# Patient Record
Sex: MALE | Race: WHITE | NOT HISPANIC OR LATINO
[De-identification: names, ages, dates, MRNs, and addresses within clinical notes are randomized per-mention and may not be internally consistent; named-entity substitution may affect disease eponyms.]

---

## 2020-06-09 PROBLEM — Z00.00 ENCOUNTER FOR PREVENTIVE HEALTH EXAMINATION: Status: ACTIVE | Noted: 2020-06-09

## 2020-06-15 ENCOUNTER — APPOINTMENT (OUTPATIENT)
Dept: SURGERY | Facility: CLINIC | Age: 35
End: 2020-06-15
Payer: COMMERCIAL

## 2020-06-15 VITALS
TEMPERATURE: 98.1 F | DIASTOLIC BLOOD PRESSURE: 84 MMHG | BODY MASS INDEX: 25.77 KG/M2 | HEART RATE: 75 BPM | SYSTOLIC BLOOD PRESSURE: 132 MMHG | HEIGHT: 69 IN | WEIGHT: 174 LBS

## 2020-06-15 DIAGNOSIS — Z78.9 OTHER SPECIFIED HEALTH STATUS: ICD-10-CM

## 2020-06-15 PROCEDURE — 46600 DIAGNOSTIC ANOSCOPY SPX: CPT

## 2020-06-15 PROCEDURE — 99203 OFFICE O/P NEW LOW 30 MIN: CPT | Mod: 25

## 2020-06-16 PROBLEM — Z78.9 NON-SMOKER: Status: ACTIVE | Noted: 2020-06-12

## 2020-06-16 RX ORDER — PIROXICAM 10 MG/1
10 CAPSULE ORAL
Qty: 60 | Refills: 0 | Status: ACTIVE | COMMUNITY
Start: 2020-01-14

## 2020-06-16 RX ORDER — OSELTAMIVIR PHOSPHATE 75 MG/1
75 CAPSULE ORAL
Qty: 10 | Refills: 0 | Status: ACTIVE | COMMUNITY
Start: 2019-12-30

## 2020-06-16 RX ORDER — CLINDAMYCIN PHOSPHATE 1 G/10ML
1 GEL TOPICAL
Qty: 30 | Refills: 0 | Status: ACTIVE | COMMUNITY
Start: 2020-06-01

## 2020-06-16 RX ORDER — UREA 20 %
20 CREAM (GRAM) TOPICAL
Qty: 85 | Refills: 0 | Status: ACTIVE | COMMUNITY
Start: 2019-06-13

## 2020-06-16 RX ORDER — BENZONATATE 200 MG/1
200 CAPSULE ORAL
Qty: 90 | Refills: 0 | Status: ACTIVE | COMMUNITY
Start: 2020-03-07

## 2020-06-16 RX ORDER — DICLOFENAC SODIUM 20 MG/G
2 SOLUTION TOPICAL
Qty: 112 | Refills: 0 | Status: ACTIVE | COMMUNITY
Start: 2020-02-25

## 2020-06-16 NOTE — PROCEDURE
[FreeTextEntry1] : ROSIE and anoscopy show normal sphincter tone, normal internal hemorrhoids and no masses.\par

## 2020-06-16 NOTE — PHYSICAL EXAM
[Abdomen Masses] : No abdominal masses [Abdomen Tenderness] : ~T No ~M abdominal tenderness [No HSM] : no hepatosplenomegaly [Fistula] : no fistulas [Excoriation] : no perianal excoriation [Ulcer ___ cm] : no ulcers [Pilonidal Cyst] : no pilonidal cysts [Wart] : no warts [Nonprolapsing] : a nonprolapsing (grade I) [Tender, Swollen] : nontender, non-swollen [Normal] : was normal [Thrombosed] : that was not thrombosed [Skin Tags] : residual hemorrhoidal skin tags were noted [None] : there was no rectal abscess [Normal Rate and Rhythm] : normal rate and rhythm [Respiratory Effort] : normal respiratory effort [de-identified] : External examination shows a 2 cm thickened and nodular skin tag in the posterior midline.  No erythema induration or purulence [de-identified] : awake, alert and in no acute distress

## 2020-06-16 NOTE — ASSESSMENT
[FreeTextEntry1] : 34M with perianal skin tag\par \par I discussed the skin tag with the patient.  The skin tag appears thickened and nodular on exam.  It is unclear if this is part of the skin tag itself or secondary to previous treatment.  Given the abnormal appearance I recommended examination under anesthesia and excision of perianal skin tag.  All risks benefits and alternatives including bleeding, infection, damage to the sphincter complex, incontinence to gas or stool or recurrence were discussed.  I described a post operative course of pain for approximately 2-4 weeks. Patient was in agreement with the plan and signed surgical consent.\par

## 2020-06-16 NOTE — HISTORY OF PRESENT ILLNESS
[FreeTextEntry1] : Patient is a 34M who presents for evaluation of an anal skin tag.  Patient states he noticed extra tissue in the area for approximately 6 months.  He was evaluated by Dr. Salazar of dermatology who diagnosed the patient with perianal skin tags.  He treated them in the office with some improvement.  The patient states that there is still some residual skin tag present and would like to be evaluated for excision.  He denies other symptoms inclduing fevers, chills, nausea, vomiting, abdominal pain, constipation, diarrhea, blood in his stool or unexpected weight loss.  Patient denies a family history of colon cancer rectal cancer or inflammatory bowel disease.  Patient has never had a colonoscopy.\par

## 2020-06-16 NOTE — CONSULT LETTER
[Dear  ___] : Dear  [unfilled], [Consult Closing:] : Thank you very much for allowing me to participate in the care of this patient.  If you have any questions, please do not hesitate to contact me. [Consult Letter:] : I had the pleasure of evaluating your patient, [unfilled]. [Please see my note below.] : Please see my note below. [FreeTextEntry3] : Sincerely,\par \par Venkat Melvin MD, Colon and Rectal Surgery\par \par [employer] at [facility]\par [address] [place] [address]\par [facility], 3rd Floor\par [place], [place] [address]\par Tel [phone] ext [phone]\par Fax [phone]\par

## 2020-07-06 ENCOUNTER — OUTPATIENT (OUTPATIENT)
Dept: OUTPATIENT SERVICES | Facility: HOSPITAL | Age: 35
LOS: 1 days | Discharge: HOME | End: 2020-07-06

## 2020-07-06 VITALS
WEIGHT: 179.02 LBS | HEIGHT: 69 IN | RESPIRATION RATE: 18 BRPM | OXYGEN SATURATION: 100 % | DIASTOLIC BLOOD PRESSURE: 78 MMHG | SYSTOLIC BLOOD PRESSURE: 128 MMHG | HEART RATE: 78 BPM | TEMPERATURE: 97 F

## 2020-07-06 DIAGNOSIS — K64.4 RESIDUAL HEMORRHOIDAL SKIN TAGS: ICD-10-CM

## 2020-07-06 DIAGNOSIS — Z01.818 ENCOUNTER FOR OTHER PREPROCEDURAL EXAMINATION: ICD-10-CM

## 2020-07-06 NOTE — H&P PST ADULT - NSANTHOSAYNRD_GEN_A_CORE
No. AYLIN screening performed.  STOP BANG Legend: 0-2 = LOW Risk; 3-4 = INTERMEDIATE Risk; 5-8 = HIGH Risk

## 2020-07-07 ENCOUNTER — OUTPATIENT (OUTPATIENT)
Dept: OUTPATIENT SERVICES | Facility: HOSPITAL | Age: 35
LOS: 1 days | Discharge: HOME | End: 2020-07-07

## 2020-07-07 ENCOUNTER — LABORATORY RESULT (OUTPATIENT)
Age: 35
End: 2020-07-07

## 2020-07-07 DIAGNOSIS — Z11.59 ENCOUNTER FOR SCREENING FOR OTHER VIRAL DISEASES: ICD-10-CM

## 2020-07-10 ENCOUNTER — APPOINTMENT (OUTPATIENT)
Dept: SURGERY | Facility: HOSPITAL | Age: 35
End: 2020-07-10

## 2020-07-10 ENCOUNTER — OUTPATIENT (OUTPATIENT)
Dept: OUTPATIENT SERVICES | Facility: HOSPITAL | Age: 35
LOS: 1 days | Discharge: HOME | End: 2020-07-10
Payer: COMMERCIAL

## 2020-07-10 ENCOUNTER — RESULT REVIEW (OUTPATIENT)
Age: 35
End: 2020-07-10

## 2020-07-10 VITALS — RESPIRATION RATE: 18 BRPM | DIASTOLIC BLOOD PRESSURE: 76 MMHG | SYSTOLIC BLOOD PRESSURE: 138 MMHG | HEART RATE: 85 BPM

## 2020-07-10 VITALS
DIASTOLIC BLOOD PRESSURE: 82 MMHG | RESPIRATION RATE: 17 BRPM | HEIGHT: 69 IN | OXYGEN SATURATION: 100 % | WEIGHT: 173.94 LBS | TEMPERATURE: 98 F | HEART RATE: 79 BPM | SYSTOLIC BLOOD PRESSURE: 139 MMHG

## 2020-07-10 PROCEDURE — 88304 TISSUE EXAM BY PATHOLOGIST: CPT | Mod: 26

## 2020-07-10 PROCEDURE — 46220 EXCISE ANAL EXT TAG/PAPILLA: CPT

## 2020-07-10 RX ORDER — OXYCODONE AND ACETAMINOPHEN 5; 325 MG/1; MG/1
1 TABLET ORAL ONCE
Refills: 0 | Status: DISCONTINUED | OUTPATIENT
Start: 2020-07-10 | End: 2020-07-10

## 2020-07-10 RX ORDER — SODIUM CHLORIDE 9 MG/ML
1000 INJECTION, SOLUTION INTRAVENOUS
Refills: 0 | Status: DISCONTINUED | OUTPATIENT
Start: 2020-07-10 | End: 2020-07-13

## 2020-07-10 RX ORDER — HYDROMORPHONE HYDROCHLORIDE 2 MG/ML
0.5 INJECTION INTRAMUSCULAR; INTRAVENOUS; SUBCUTANEOUS
Refills: 0 | Status: DISCONTINUED | OUTPATIENT
Start: 2020-07-10 | End: 2020-07-13

## 2020-07-10 RX ORDER — DOCUSATE SODIUM 100 MG
1 CAPSULE ORAL
Qty: 15 | Refills: 0
Start: 2020-07-10 | End: 2020-07-14

## 2020-07-10 RX ORDER — OXYCODONE AND ACETAMINOPHEN 5; 325 MG/1; MG/1
1 TABLET ORAL
Qty: 20 | Refills: 0
Start: 2020-07-10 | End: 2020-07-14

## 2020-07-10 RX ORDER — ONDANSETRON 8 MG/1
4 TABLET, FILM COATED ORAL ONCE
Refills: 0 | Status: DISCONTINUED | OUTPATIENT
Start: 2020-07-10 | End: 2020-07-13

## 2020-07-10 RX ADMIN — OXYCODONE AND ACETAMINOPHEN 1 TABLET(S): 5; 325 TABLET ORAL at 18:03

## 2020-07-10 RX ADMIN — SODIUM CHLORIDE 75 MILLILITER(S): 9 INJECTION, SOLUTION INTRAVENOUS at 18:04

## 2020-07-10 NOTE — ASU DISCHARGE PLAN (ADULT/PEDIATRIC) - ASU DC SPECIAL INSTRUCTIONSFT
You are being discharged from [facility]. Please follow-up in Dr. Melvin's office as previously scheduled. You have been prescribed Percocet for breakthrough pain relief, please take as directed. Please avoid driving while taking narcotic pain medication. You have also been prescribed Colace for constipation, Please take as needed. If you have any further questions about your care, please do not hesitate to contact Dr. Melvin's office.

## 2020-07-10 NOTE — PRE-ANESTHESIA EVALUATION ADULT - NSANTHOSAYNRD_GEN_A_CORE
No. AYLIN screening performed.  STOP BANG Legend: 0-2 = LOW Risk; 3-4 = INTERMEDIATE Risk; 5-8 = HIGH Risk
No. AYLIN screening performed.  STOP BANG Legend: 0-2 = LOW Risk; 3-4 = INTERMEDIATE Risk; 5-8 = HIGH Risk

## 2020-07-10 NOTE — BRIEF OPERATIVE NOTE - NSICDXBRIEFPROCEDURE_GEN_ALL_CORE_FT
PROCEDURES:  Anal tag excision 10-Jul-2020 17:47:10  Judy Osei  Exam under anesthesia, anus 10-Jul-2020 17:46:35  Judy Osei

## 2020-07-10 NOTE — ASU DISCHARGE PLAN (ADULT/PEDIATRIC) - CARE PROVIDER_API CALL
Venkat Melvin)  ColonRectal Surgery; Surgery  [address] [facility], [address]rd [place] [address]  Phone: [phone] ext [phone]  Fax: [phone]  Established Patient  Follow Up Time:

## 2020-07-10 NOTE — ASU PREOP CHECKLIST - TO WHOM
opportunity for questions and answers rendered RIGOBERTO Abebe RN opportunity for questions and answers rendered

## 2020-07-14 LAB
SURGICAL PATHOLOGY STUDY: SIGNIFICANT CHANGE UP
SURGICAL PATHOLOGY STUDY: SIGNIFICANT CHANGE UP

## 2020-07-16 DIAGNOSIS — K64.4 RESIDUAL HEMORRHOIDAL SKIN TAGS: ICD-10-CM

## 2020-07-16 RX ORDER — DOCUSATE SODIUM 100 MG/1
100 CAPSULE ORAL 3 TIMES DAILY
Qty: 90 | Refills: 2 | Status: ACTIVE | COMMUNITY
Start: 2020-07-16 | End: 1900-01-01

## 2020-07-27 ENCOUNTER — APPOINTMENT (OUTPATIENT)
Dept: SURGERY | Facility: CLINIC | Age: 35
End: 2020-07-27
Payer: COMMERCIAL

## 2020-07-27 VITALS
WEIGHT: 179 LBS | HEIGHT: 69 IN | SYSTOLIC BLOOD PRESSURE: 124 MMHG | DIASTOLIC BLOOD PRESSURE: 78 MMHG | HEART RATE: 108 BPM | BODY MASS INDEX: 26.51 KG/M2 | TEMPERATURE: 97.9 F

## 2020-07-27 PROCEDURE — 99024 POSTOP FOLLOW-UP VISIT: CPT

## 2020-07-27 NOTE — CONSULT LETTER
[Dear  ___] : Dear  [unfilled], [Consult Letter:] : I had the pleasure of evaluating your patient, [unfilled]. [Please see my note below.] : Please see my note below. [Consult Closing:] : Thank you very much for allowing me to participate in the care of this patient.  If you have any questions, please do not hesitate to contact me. [FreeTextEntry3] : Sincerely,\par \par Venkat Melvin MD, Colon and Rectal Surgery\par \par [employer] at [facility]\par [address] [place] [address]\par [facility], 3rd Floor\par [place], [place] [address]\par Tel [phone] ext 2\par Fax [phone]\par

## 2020-07-27 NOTE — HISTORY OF PRESENT ILLNESS
[FreeTextEntry1] : Patient is a 34M who presents for follow up of an anal skin tag s/p excision.  Patient states he noticed extra tissue in the area for approximately 6 months.  He was evaluated by Dr. Salazar of dermatology who diagnosed the patient with perianal skin tags.  He treated them in the office with some improvement.  On 7/10/2020 he underwent EUA and excision of skin tag.  Pathology showed fibroepithelial polyp.  He presents for follow up.  He states he feels well.  He has minimal pain and bleeding.  He does notice an area that he feels may be another skin tag. He denies other symptoms including fevers, chills, nausea, vomiting, abdominal pain, constipation, diarrhea, blood in his stool or unexpected weight loss.  Patient denies a family history of colon cancer rectal cancer or inflammatory bowel disease.  Patient has never had a colonoscopy.\par

## 2020-07-27 NOTE — PHYSICAL EXAM
[Abdomen Tenderness] : ~T No ~M abdominal tenderness [Abdomen Masses] : No abdominal masses [No HSM] : no hepatosplenomegaly [Excoriation] : no perianal excoriation [Fistula] : no fistulas [Wart] : no warts [Ulcer ___ cm] : no ulcers [Pilonidal Cyst] : no pilonidal cysts [Nonprolapsing] : a nonprolapsing (grade I) [Tender, Swollen] : nontender, non-swollen [Thrombosed] : that was not thrombosed [Skin Tags] : residual hemorrhoidal skin tags were noted [Normal] : was normal [None] : there was no rectal mass  [Respiratory Effort] : normal respiratory effort [Normal Rate and Rhythm] : normal rate and rhythm [de-identified] : External examination shows a partially healed wound with a 0.5cm skin edematous skin tag in the posterior midline.  No erythema induration or purulence [de-identified] : awake, alert and in no acute distress

## 2020-07-27 NOTE — ASSESSMENT
[FreeTextEntry1] : 34M s/p excision of anal skin tag\par \par The patient is recovering well.  I recommended that he continue to keep the area clean and dry.  We will see him back in 1 month.

## 2020-08-24 ENCOUNTER — APPOINTMENT (OUTPATIENT)
Dept: SURGERY | Facility: CLINIC | Age: 35
End: 2020-08-24
Payer: COMMERCIAL

## 2020-08-24 VITALS
TEMPERATURE: 97.9 F | HEART RATE: 96 BPM | HEIGHT: 69 IN | DIASTOLIC BLOOD PRESSURE: 78 MMHG | WEIGHT: 180 LBS | SYSTOLIC BLOOD PRESSURE: 132 MMHG | BODY MASS INDEX: 26.66 KG/M2

## 2020-08-24 PROBLEM — Z78.9 OTHER SPECIFIED HEALTH STATUS: Chronic | Status: ACTIVE | Noted: 2020-07-06

## 2020-08-24 PROCEDURE — 99213 OFFICE O/P EST LOW 20 MIN: CPT

## 2020-08-24 NOTE — CONSULT LETTER
[Consult Letter:] : I had the pleasure of evaluating your patient, [unfilled]. [Dear  ___] : Dear  [unfilled], [Please see my note below.] : Please see my note below. [FreeTextEntry3] : Sincerely,\par \par Venkat Melvin MD, Colon and Rectal Surgery\par \par [employer] at [facility]\par [address] [place] [address]\par [facility], 3rd Floor\par [place], [place] [address]\par Tel [phone] ext 2\par Fax [phone]\par  [Consult Closing:] : Thank you very much for allowing me to participate in the care of this patient.  If you have any questions, please do not hesitate to contact me.

## 2020-08-24 NOTE — ASSESSMENT
[FreeTextEntry1] : 34M s/p excision of anal skin tag\par \par The patient is recovering well.  I recommended that he continue to keep the area clean and dry.  He would like to have the skin tag excised.  We discussed waiting until his wound is completely healed prior.  We will see him back in 1 month.

## 2020-08-24 NOTE — HISTORY OF PRESENT ILLNESS
[FreeTextEntry1] : Patient is a 34M who presents for follow up of an anal skin tag s/p excision.  Patient states he noticed extra tissue in the area for approximately 6 months.  He was evaluated by Dr. Salazar of dermatology who diagnosed the patient with perianal skin tags.  He treated them in the office with some improvement.  On 7/10/2020 he underwent EUA and excision of skin tag.  Pathology showed fibroepithelial polyp.  On his last visit he was found to have a healing wound and a small adjacent skin tag.  He presents for follow up.  He states he feels well.  He has no pain or bleeding.  He denies other symptoms including fevers, chills, nausea, vomiting, abdominal pain, constipation, diarrhea, blood in his stool or unexpected weight loss.  Patient denies a family history of colon cancer rectal cancer or inflammatory bowel disease.  Patient has never had a colonoscopy.\par

## 2020-08-24 NOTE — PHYSICAL EXAM
[Abdomen Tenderness] : ~T No ~M abdominal tenderness [Abdomen Masses] : No abdominal masses [No HSM] : no hepatosplenomegaly [Excoriation] : no perianal excoriation [Fistula] : no fistulas [Wart] : no warts [Ulcer ___ cm] : no ulcers [Pilonidal Cyst] : no pilonidal cysts [Nonprolapsing] : a nonprolapsing (grade I) [Tender, Swollen] : nontender, non-swollen [Thrombosed] : that was not thrombosed [Normal] : was normal [Skin Tags] : residual hemorrhoidal skin tags were noted [None] : there was no rectal abscess [Respiratory Effort] : normal respiratory effort [de-identified] : External examination shows a nearly healed wound with a 0.5cm skin tag in the posterior midline.  No erythema induration or purulence [Normal Rate and Rhythm] : normal rate and rhythm [de-identified] : awake, alert and in no acute distress

## 2020-09-21 ENCOUNTER — APPOINTMENT (OUTPATIENT)
Dept: SURGERY | Facility: CLINIC | Age: 35
End: 2020-09-21
Payer: COMMERCIAL

## 2020-09-21 VITALS
HEIGHT: 69 IN | SYSTOLIC BLOOD PRESSURE: 120 MMHG | DIASTOLIC BLOOD PRESSURE: 72 MMHG | WEIGHT: 184 LBS | HEART RATE: 83 BPM | BODY MASS INDEX: 27.25 KG/M2 | TEMPERATURE: 97.6 F

## 2020-09-21 PROCEDURE — 99212 OFFICE O/P EST SF 10 MIN: CPT

## 2020-09-21 NOTE — PHYSICAL EXAM
[Abdomen Masses] : No abdominal masses [Abdomen Tenderness] : ~T No ~M abdominal tenderness [No HSM] : no hepatosplenomegaly [Excoriation] : no perianal excoriation [Fistula] : no fistulas [Wart] : no warts [Ulcer ___ cm] : no ulcers [Pilonidal Cyst] : no pilonidal cysts [Nonprolapsing] : a nonprolapsing (grade I) [Tender, Swollen] : nontender, non-swollen [Thrombosed] : that was not thrombosed [Skin Tags] : residual hemorrhoidal skin tags were noted [Normal] : was normal [None] : there was no rectal mass  [Respiratory Effort] : normal respiratory effort [Normal Rate and Rhythm] : normal rate and rhythm [de-identified] : External examination shows a healed wound with a 0.5cm skin tag in the posterior midline.  No erythema induration or purulence [de-identified] : awake, alert and in no acute distress

## 2020-09-21 NOTE — ASSESSMENT
[FreeTextEntry1] : 34M s/p excision of anal skin tag\par \par The patient has recovered well. We discussed the small skin tag that is present.  He expressed interest in having it removed.  We discussed removing it in the office on his next visit.  We will see him back in 1 month.

## 2020-09-21 NOTE — CONSULT LETTER
[Dear  ___] : Dear  [unfilled], [Courtesy Letter:] : I had the pleasure of seeing your patient, [unfilled], in my office today. [Please see my note below.] : Please see my note below. [Consult Closing:] : Thank you very much for allowing me to participate in the care of this patient.  If you have any questions, please do not hesitate to contact me. [FreeTextEntry3] : Sincerely,\par \par Venkat Melvin MD, Colon and Rectal Surgery\par \par [employer] at [facility]\par [address] [place] [address]\par [facility], 3rd Floor\par [place], [place] [address]\par Tel [phone] ext 2\par Fax [phone]\par

## 2020-09-21 NOTE — HISTORY OF PRESENT ILLNESS
[FreeTextEntry1] : Patient is a 34M who presents for follow up of an anal skin tag s/p excision.  Patient states he noticed extra tissue in the area for approximately 6 months.  He was evaluated by Dr. Salazar of dermatology who diagnosed the patient with perianal skin tags.  He treated them in the office with some improvement.  On 7/10/2020 he underwent EUA and excision of skin tag.  Pathology showed fibroepithelial polyp.  On his last visit he was found to have a healing wound and a small adjacent skin tag.  Since his last visit he has had no changes. He has daily BM without issue. He denies other symptoms including fevers, chills, nausea, vomiting, abdominal pain, constipation, diarrhea, blood in his stool or unexpected weight loss.  Patient denies a family history of colon cancer rectal cancer or inflammatory bowel disease.  Patient has never had a colonoscopy.\par

## 2020-10-19 ENCOUNTER — APPOINTMENT (OUTPATIENT)
Dept: SURGERY | Facility: CLINIC | Age: 35
End: 2020-10-19
Payer: COMMERCIAL

## 2020-10-19 ENCOUNTER — LABORATORY RESULT (OUTPATIENT)
Age: 35
End: 2020-10-19

## 2020-10-19 VITALS
BODY MASS INDEX: 27.4 KG/M2 | TEMPERATURE: 97.7 F | WEIGHT: 185 LBS | HEIGHT: 69 IN | SYSTOLIC BLOOD PRESSURE: 126 MMHG | DIASTOLIC BLOOD PRESSURE: 84 MMHG | HEART RATE: 90 BPM

## 2020-10-19 PROCEDURE — 11200 RMVL SKIN TAGS UP TO&INC 15: CPT

## 2020-10-19 PROCEDURE — 99214 OFFICE O/P EST MOD 30 MIN: CPT | Mod: 57

## 2020-10-21 NOTE — HISTORY OF PRESENT ILLNESS
[FreeTextEntry1] : Patient is a 34M who presents for follow up of an anal skin tag s/p excision.  Patient states he noticed extra tissue in the area for approximately 6 months.  He was evaluated by Dr. Salazar of dermatology who diagnosed the patient with perianal skin tags.  He treated them in the office with some improvement.  On 7/10/2020 he underwent EUA and excision of skin tag.  Pathology showed fibroepithelial polyp.  He has since healed but now has a much smaller skin tag present.  He presents today to have it removed. He has daily BM without issue. He denies other symptoms including fevers, chills, nausea, vomiting, abdominal pain, constipation, diarrhea, blood in his stool or unexpected weight loss.  Patient denies a family history of colon cancer rectal cancer or inflammatory bowel disease.  Patient has never had a colonoscopy.\par

## 2020-10-21 NOTE — PROCEDURE
[FreeTextEntry1] : I discussed at length anal skin tags with the patient.  We discussed the benign nature and the possibility of additional skin tags. Patient requested removal due to hygiene concerns.\par \par The patient was positioned in the prone jacknife position for the procedure.  The area of prepped using betadine swabs.  5mL of 1% lidocaine was injected in the skin to anesthetize the area.  Once appropriate anesthesia was achieved, the skin tag was excised using a 15 blade. The specimen was sent for pathology. The wound was closed with a 3-0 vicryl suture.  The wound was then dressed with dry gauze dressing.  The patient tolerated the procedure well and there were no complications.\par

## 2020-10-21 NOTE — ASSESSMENT
[FreeTextEntry1] : 34M s/p excision of anal skin tag\par \par I discussed that he should keep the area clean and dry.  We will see him back in 2 weeks.

## 2020-10-21 NOTE — PHYSICAL EXAM
[Abdomen Masses] : No abdominal masses [Abdomen Tenderness] : ~T No ~M abdominal tenderness [No HSM] : no hepatosplenomegaly [Excoriation] : no perianal excoriation [Fistula] : no fistulas [Wart] : no warts [Ulcer ___ cm] : no ulcers [Pilonidal Cyst] : no pilonidal cysts [Nonprolapsing] : a nonprolapsing (grade I) [Tender, Swollen] : nontender, non-swollen [Thrombosed] : that was not thrombosed [Skin Tags] : residual hemorrhoidal skin tags were noted [Normal] : was normal [None] : there was no rectal mass  [Respiratory Effort] : normal respiratory effort [Normal Rate and Rhythm] : normal rate and rhythm [de-identified] : External examination shows a healed wound with a 0.5cm skin tag in the posterior midline.  No erythema induration or purulence [de-identified] : awake, alert and in no acute distress

## 2020-10-21 NOTE — CONSULT LETTER
[Dear  ___] : Dear  [unfilled], [Courtesy Letter:] : I had the pleasure of seeing your patient, [unfilled], in my office today. [Please see my note below.] : Please see my note below. [Consult Closing:] : Thank you very much for allowing me to participate in the care of this patient.  If you have any questions, please do not hesitate to contact me. [FreeTextEntry3] : Sincerely,\par \par Venkat Melvin MD, Colon and Rectal Surgery\par \par [employer] at [facility]\par [address] [place] [address]\par [facility], 3rd Floor\par [place], [place] [address]\par Tel [phone] ext [phone]\par Fax [phone]\par

## 2020-11-09 ENCOUNTER — APPOINTMENT (OUTPATIENT)
Dept: SURGERY | Facility: CLINIC | Age: 35
End: 2020-11-09
Payer: COMMERCIAL

## 2020-11-09 VITALS
WEIGHT: 184 LBS | HEIGHT: 69 IN | TEMPERATURE: 97.6 F | DIASTOLIC BLOOD PRESSURE: 80 MMHG | HEART RATE: 81 BPM | SYSTOLIC BLOOD PRESSURE: 130 MMHG | BODY MASS INDEX: 27.25 KG/M2

## 2020-11-09 PROCEDURE — 99072 ADDL SUPL MATRL&STAF TM PHE: CPT

## 2020-11-09 PROCEDURE — 99213 OFFICE O/P EST LOW 20 MIN: CPT

## 2020-11-09 NOTE — CONSULT LETTER
[Dear  ___] : Dear  [unfilled], [Consult Letter:] : I had the pleasure of evaluating your patient, [unfilled]. [Please see my note below.] : Please see my note below. [Consult Closing:] : Thank you very much for allowing me to participate in the care of this patient.  If you have any questions, please do not hesitate to contact me. [FreeTextEntry3] : Sincerely,\par \par Venkat Melvin MD, Colon and Rectal Surgery\par \par [employer] at [facility]\par [address] [place] [address]\par [facility], 3rd Floor\par [place], [place] [address]\par Tel [phone] ext 2\par Fax [phone]\par

## 2020-11-09 NOTE — ASSESSMENT
[FreeTextEntry1] : 34M s/p excision of anal skin tag\par \par The patient is healing well from the recent skin tag removal. I recommended that he keep the area clean and dry.  We will see him back in 1 month.

## 2020-11-09 NOTE — HISTORY OF PRESENT ILLNESS
[FreeTextEntry1] : Patient is a 34M who presents for follow up of an anal skin tag s/p excision.  Patient states he noticed extra tissue in the area for approximately 6 months.  He was evaluated by Dr. Salazar of dermatology who diagnosed the patient with perianal skin tags.  He treated them in the office with some improvement.  On 7/10/2020 he underwent EUA and excision of skin tag.  Pathology showed fibroepithelial polyp.  He has since healed but now has a much smaller skin tag present.  This was removed in the office on his last visit.  Pathology was consistent with skin tag.  He presents today for follow up.  Patient states he is doing well with no pain or bleeding.  He has daily BM without issue. He denies other symptoms including fevers, chills, nausea, vomiting, abdominal pain, constipation, diarrhea, blood in his stool or unexpected weight loss.  Patient denies a family history of colon cancer rectal cancer or inflammatory bowel disease.  Patient has never had a colonoscopy.\par

## 2020-11-09 NOTE — PHYSICAL EXAM
[No HSM] : no hepatosplenomegaly [Nonprolapsing] : a nonprolapsing (grade I) [Skin Tags] : residual hemorrhoidal skin tags were noted [Normal] : was normal [None] : there was no rectal mass  [Respiratory Effort] : normal respiratory effort [Normal Rate and Rhythm] : normal rate and rhythm [Abdomen Masses] : No abdominal masses [Abdomen Tenderness] : ~T No ~M abdominal tenderness [Excoriation] : no perianal excoriation [Fistula] : no fistulas [Wart] : no warts [Ulcer ___ cm] : no ulcers [Pilonidal Cyst] : no pilonidal cysts [Tender, Swollen] : nontender, non-swollen [Thrombosed] : that was not thrombosed [de-identified] : External examination shows a healed wound with a healing wound in the posterior midline.  No erythema induration or purulence [de-identified] : awake, alert and in no acute distress

## 2021-01-04 ENCOUNTER — APPOINTMENT (OUTPATIENT)
Dept: SURGERY | Facility: CLINIC | Age: 36
End: 2021-01-04
Payer: COMMERCIAL

## 2021-01-04 VITALS
BODY MASS INDEX: 26.96 KG/M2 | TEMPERATURE: 97.3 F | HEART RATE: 84 BPM | WEIGHT: 182 LBS | DIASTOLIC BLOOD PRESSURE: 84 MMHG | HEIGHT: 69 IN | SYSTOLIC BLOOD PRESSURE: 136 MMHG

## 2021-01-04 DIAGNOSIS — K64.4 RESIDUAL HEMORRHOIDAL SKIN TAGS: ICD-10-CM

## 2021-01-04 PROCEDURE — 99213 OFFICE O/P EST LOW 20 MIN: CPT

## 2021-01-04 PROCEDURE — 99072 ADDL SUPL MATRL&STAF TM PHE: CPT

## 2021-01-05 PROBLEM — K64.4 RESIDUAL HEMORRHOIDAL SKIN TAGS: Status: ACTIVE | Noted: 2020-06-16

## 2021-01-05 NOTE — HISTORY OF PRESENT ILLNESS
[FreeTextEntry1] : Patient is a 35M who presents for follow up of an anal skin tag s/p excision.  He was evaluated by Dr. Salazar of dermatology who diagnosed the patient with perianal skin tags.  He treated them in the office with some improvement.  On 7/10/2020 he underwent EUA and excision of skin tag.  Pathology showed fibroepithelial polyp.  He has since healed but now has a much smaller skin tag present.  This was removed in the office. Pathology was consistent with skin tag.  He presents today for follow up.  Patient states he is doing well with no pain or bleeding.  He has daily BM without issue. He denies other symptoms including fevers, chills, nausea, vomiting, abdominal pain, constipation, diarrhea, blood in his stool or unexpected weight loss.  Patient denies a family history of colon cancer rectal cancer or inflammatory bowel disease.  Patient has never had a colonoscopy.\par

## 2021-01-05 NOTE — ASSESSMENT
[FreeTextEntry1] : 34M s/p excision of anal skin tag\par \par The patient has healed well.  We will see him back as needed.

## 2021-01-05 NOTE — PHYSICAL EXAM
[Abdomen Masses] : No abdominal masses [Abdomen Tenderness] : ~T No ~M abdominal tenderness [No HSM] : no hepatosplenomegaly [Excoriation] : no perianal excoriation [Fistula] : no fistulas [Wart] : no warts [Ulcer ___ cm] : no ulcers [Pilonidal Cyst] : no pilonidal cysts [Nonprolapsing] : a nonprolapsing (grade I) [Tender, Swollen] : nontender, non-swollen [Thrombosed] : that was not thrombosed [Skin Tags] : residual hemorrhoidal skin tags were noted [Normal] : was normal [None] : there was no rectal mass  [Respiratory Effort] : normal respiratory effort [Normal Rate and Rhythm] : normal rate and rhythm [de-identified] : External examination shows a healed wound. No erythema induration or purulence [de-identified] : awake, alert and in no acute distress

## 2021-03-24 ENCOUNTER — EMERGENCY (EMERGENCY)
Facility: HOSPITAL | Age: 36
LOS: 0 days | Discharge: HOME | End: 2021-03-24
Attending: EMERGENCY MEDICINE | Admitting: EMERGENCY MEDICINE
Payer: OTHER MISCELLANEOUS

## 2021-03-24 VITALS
RESPIRATION RATE: 18 BRPM | HEART RATE: 73 BPM | HEIGHT: 69 IN | WEIGHT: 175.05 LBS | TEMPERATURE: 98 F | OXYGEN SATURATION: 98 % | DIASTOLIC BLOOD PRESSURE: 76 MMHG | SYSTOLIC BLOOD PRESSURE: 144 MMHG

## 2021-03-24 DIAGNOSIS — S01.112A LACERATION WITHOUT FOREIGN BODY OF LEFT EYELID AND PERIOCULAR AREA, INITIAL ENCOUNTER: ICD-10-CM
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
## 2021-03-24 DIAGNOSIS — S06.0X0A CONCUSSION WITHOUT LOSS OF CONSCIOUSNESS, INITIAL ENCOUNTER: ICD-10-CM

## 2021-03-24 PROCEDURE — 99284 EMERGENCY DEPT VISIT MOD MDM: CPT

## 2021-03-24 NOTE — ED PROVIDER NOTE - NS ED ROS FT
Constitutional: See HPI.  Eyes: No visual changes, eye pain or discharge. No Photophobia  ENMT: No hearing changes, pain, discharge or infections. No neck pain or stiffness. No limited ROM  Cardiac: No SOB or edema. No chest pain with exertion.  Respiratory: No cough or respiratory distress. No hemoptysis. No history of asthma or RAD.  GI: No nausea, vomiting, diarrhea or abdominal pain.  : No dysuria, frequency or burning. No Discharge  MS: No myalgia, muscle weakness, joint pain or back pain.  Neuro: see hpi   Skin: see hpi   Except as documented in the HPI, all other systems are negative.

## 2021-03-24 NOTE — ED PROVIDER NOTE - CLINICAL SUMMARY MEDICAL DECISION MAKING FREE TEXT BOX
35yM  pw [facility]I  laceration to left eyebrow occurred this morning 8am - no loc  no vomiting  had lac reparied in [facility] - plan for plastic for revision tomorrow -  here in ED for  continued mild headache  and trouble sleeping.  Alert and oriented.  CN 2-12 intact.  Motor strength and sensory response is symmetric.    CB intact. normal gait, gcs 15  Concussion instructions discussed   including  cognitive and  physical rest   .pt and mother verbalize understanding  -  will follow with  PMD this week and concussion specialist if symptoms persist

## 2021-03-24 NOTE — ED PROVIDER NOTE - OBJECTIVE STATEMENT
35M no PMHx p/w s/p head injury this morning at 8 am, hit head when turning around onto left fixture, left brow lac[place] with [place] [facility] [place] up with plastics tomorrow for repair p/w lightheadedness, blurred vision. no n/v, bifrontal like head gradual onset, non-worsening. Patient denies other sxs.

## 2021-03-24 NOTE — ED PROVIDER NOTE - PATIENT PORTAL LINK FT
You can access the FollowMyHealth Patient Portal offered by [facility] by registering at the following website: [email or web address]. By joining [facility]’s FollowMyHealth portal, you will also be able to view your health information using other applications (apps) compatible with our system.

## 2021-03-24 NOTE — ED PROVIDER NOTE - PHYSICAL EXAMINATION
VITAL SIGNS: I have reviewed nursing notes and confirm.  CONSTITUTIONAL: well-appearing, non-toxic, NAD  SKIN: Warm dry, normal skin turgor, left brow 2 cm laceration   HEAD: NC   EYES: EOMI, PERRLA, no scleral icterus  ENT: Moist mucous membranes, normal pharynx with no erythema or exudates  NECK: Supple; non tender. Full ROM. No cervical LAD  CARD: RRR, no murmurs, rubs or gallops  RESP: clear to ausculation b/l.  No rales, rhonchi, or wheezing.  ABD: soft, + BS, non-tender, non-distended, no rebound or guarding. No CVA tenderness  EXT: Full ROM, no bony tenderness, no pedal edema, no calf tenderness  NEURO: normal motor. normal sensory. CN II-XII intact. Cerebellar testing normal. Normal gait.  PSYCH: Cooperative, appropriate.

## 2021-03-24 NOTE — ED PROVIDER NOTE - NSFOLLOWUPINSTRUCTIONS_ED_ALL_ED_FT
l follow with  PMD this week and concussion specialist if symptoms persist     Follow these instructions at home:  Activity     Limit activities that need a lot of thought or concentration. You may need to talk with your  or teachers about this. Limit activities such as:  Homework or work for your job.  Watching TV.  Computer work.  Playing memory games and puzzles.  Rest. Rest helps the brain to heal. Make sure you:  Get plenty of sleep at night. Do not stay up late.  Rest during the day. Take naps or rest breaks when you feel tired.  Do not do activities that could cause a second concussion, such as riding a bike or playing sports. It can be dangerous if you get another concussion before the first one has healed.  Ask your doctor when you can return to your normal activities, like driving, riding a bike, or using machinery. Your ability to react may be slower. Do not do these activities if you are dizzy. Your doctor will likely give you a plan for slowly going back to activities.  General instructions     Take over-the-counter and prescription medicines only as told by your doctor.  Do not drink alcohol until your doctor says you can.  Watch your symptoms and tell other people to do the same. Other problems (complications) can happen after a concussion. Older adults with a brain injury may have a higher risk of serious problems, such as a blood clot in the brain.  Tell your , teachers, school nurse, school counselor, , or  about your injury and symptoms. Tell them about what you can or cannot do. They should watch you for:  More problems with attention or concentration.  More trouble remembering or learning new information.  More time needed to do tasks or assignments.  Being more annoyed (irritable) or having a harder time dealing with stress.  Any other symptoms that get worse.  Keep all follow-up visits as told by your doctor. This is important.  Prevention     It is very important that you donot get another brain injury, especially before you have healed. In rare cases, another injury can cause permanent brain damage, brain swelling, or death. You have the most risk if you get another head injury in the first 7–10 days after you were hurt before. To avoid injuries:  Avoid activities that could make you get a second concussion, like contact sports.  When you have returned to sports or activities:  Avoid plays or moves that can cause you to crash into another person. This is how most concussions happen.  Follow the rules and be respectful of other players.  Get regular exercise that includes strength and balance training.  Wear a helmet when you do activities like:  Biking.  Skiing.  Skateboarding.  Skating.  Helmets can help protect you from serious skull and brain injuries, but they do not protect your from a concussion. Even when wearing a helmet, you should avoid being hit in the head.  Contact a doctor if:  Your symptoms get worse or they do not get better.  You have new symptoms.  You have another injury.  Get help right away if:  You have bad headaches or your headaches get worse.  You have weakness in any part of your body.  You are confused.  Your coordination gets worse.  You keep throwing up (vomiting).  You feel more sleepy than normal.  You twitch or shake violently (convulse) or have a seizure.  Your speech is not clear (is slurred).  You have strange behavior changes.  You have changes in how you see (vision).  You pass out (lose consciousness).  Summary  A concussion is a brain injury from a direct hit (blow) to the head or body.  This condition is treated with rest and careful watching of symptoms.  If you keep having symptoms, call your doctor.  This information is not intended to replace advice given to you by your health care provider. Make sure you discuss any questions you have with your health care provider      Head Injury, Adult  ImageThere are many types of head injuries. Head injuries can be as minor as a bump, or they can be more severe. More severe head injuries include:    A jarring injury to the brain (concussion).  A bruise of the brain (contusion). This means there is bleeding in the brain that can cause swelling.  A cracked skull (skull fracture).  Bleeding in the brain that collects, clots, and forms a bump (hematoma).    After a head injury, you may need to be observed for a while in the emergency department or urgent care. Sometimes admission to the hospital is needed.    After a head injury has happened, most problems occur within the first 24 hours, but side effects may occur up to 7–10 days after the injury. It is important to watch your condition for any changes.    What are the causes?  There are many possible causes of a head injury. A serious head injury may happen to someone who is in a car accident (motor vehicle collision). Other causes of major head injuries include bicycle or motorcycle accidents, sports injuries, and falls.    Risk factors  This condition is more likely to occur in people who:    Drink a lot of alcohol or use drugs.  Are over the age of 65.  Are at risk for falls.    What are the symptoms?  There are many possible symptoms of a head injury. Visible symptoms of a head injury include a bruise, bump, or bleeding at the site of the injury. Other non-visible symptoms include:    Feeling sleepy or not being able to stay awake.  Passing out.  Headache.  Seizures.  Dizziness.  Confusion.  Memory problems.  Nausea or vomiting.    Other possible symptoms that may develop after the head injury include:    Poor attention and concentration.  Fatigue or tiring easily.  Irritability.  Being uncomfortable around bright lights or loud noises.  Anxiety or depression.  Disturbed sleep.    How is this diagnosed?  This condition can usually be diagnosed based on your symptoms, a description of the injury, and a physical exam. You may also have imaging tests done, such as a CT scan or MRI. You will also be closely watched.    How is this treated?  Treatment for this condition depends on the severity and type of injury you have. The main goal of treatment is to prevent complications and allow the brain time to heal.    For mild head injury, you may be sent home and treatment may include:    Observation. A responsible adult should stay with you for 24 hours after your injury and check on you often.  Physical rest.  Brain rest.  Pain medicines.    For severe brain injury, treatment may include:    Close observation. This includes hospitalization with frequent physical exams. You may need to go to a hospital that specializes in head injury.  Pain medicines.  Breathing support. This may include using a ventilator.  Managing the pressure inside the brain (intracranial pressure, or ICP). This may include:    Monitoring the ICP.  Giving medicines to decrease the ICP.  Positioning you to decrease the ICP.    Medicine to prevent seizures.  Surgery to stop bleeding or to remove blood clots (craniotomy).  Surgery to remove part of the skull (decompressive craniectomy). This allows room for the brain to swell.    Follow these instructions at home:  Activity     Rest as much as possible and avoid activities that are physically hard or tiring.  Make sure you get enough sleep.  Limit activities that require a lot of thought or attention, such as:    Watching TV.  Playing memory games and puzzles.  Job-related work or homework.  Working on the computer, social media, and texting.    Avoid activities that could cause another head injury, such as playing sports, until your health care provider approves. Having another head injury, especially before the first one has healed, can be dangerous.  Ask your health care provider when it is safe for you to return to your regular activities, including work or school. Ask your health care provider for a step-by-step plan for gradually returning to activities.  Ask your health care provider when you can drive, ride a bicycle, or use heavy machinery. Your ability to react may be slower after a brain injury. Never do these activities if you are dizzy.    Lifestyle     Do not drink alcohol until your health care provider approves, and avoid drug use. Alcohol and certain drugs may slow your recovery and can put you at risk of further injury.  If it is harder than usual to remember things, write them down.  If you are easily distracted, try to do one thing at a time.  Talk with family members or close friends when making important decisions.  Tell your friends, family, a trusted colleague, and  about your injury, symptoms, and restrictions. Have them watch for any new or worsening problems.    General instructions     Take over-the-counter and prescription medicines only as told by your health care provider.  Have someone stay with you for 24 hours after your head injury. This person should watch you for any changes in your symptoms and be ready to seek medical help, as needed.  Keep all follow-up visits as told by your health care provider. This is important.    Prevention  Work on improving your balance and strength to avoid falls.  Wear a seatbelt when you are in a moving vehicle.  Wear a helmet when riding a bicycle, skiing, or doing any other sport or activity that has a risk of injury.  Drink alcohol only in moderation.  Take safety measures in your home, such as:    Removing clutter and tripping hazards from floors and stairways.  Using grab bars in bathrooms and handrails by stairs.  Placing non-slip mats on floors and in bathtubs.  Improving lighting in dim areas.    Get help right away if:  You have:    A severe headache that is not helped by medicine.  Trouble walking, have weakness in your arms and legs, or lose your balance.  Clear or bloody fluid coming from your nose or ears.  Changes in your vision.  A seizure.    You vomit.  Your symptoms get worse.  Your speech is slurred.  You pass out.  You are sleepier and have trouble staying awake.  Your pupils change size.  These symptoms may represent a serious problem that is an emergency. Do not wait to see if the symptoms will go away. Get medical help right away. Call your local emergency services (911 in the [place] Do not drive yourself to the hospital.     This information is not intended to replace advice given to you by your health care provider. Make sure you discuss any questions you have with your health care provider..            Concussion, Adult  ImageA concussion is a brain injury from a direct hit (blow) to the head or body. This injury causes the brain to shake quickly back and forth inside the skull. It is caused by:  A hit to the head.  A quick and sudden movement (jolt) of the head or neck.  How fast you will get better from a concussion depends on many things. Recovery can take time. It is important to wait to return to activity until a doctor says it is safe and your symptoms are all gone.

## 2021-03-24 NOTE — ED PROVIDER NOTE - CARE PROVIDER_API CALL
Radha Lafleur (PhD)  [facility]  [address] [place] [address]  Phone: [phone]  Fax: [phone]  Follow Up Time:

## 2021-03-25 ENCOUNTER — EMERGENCY (EMERGENCY)
Facility: HOSPITAL | Age: 36
LOS: 0 days | Discharge: HOME | End: 2021-03-25
Attending: EMERGENCY MEDICINE | Admitting: EMERGENCY MEDICINE
Payer: OTHER MISCELLANEOUS

## 2021-03-25 VITALS
HEIGHT: 69 IN | SYSTOLIC BLOOD PRESSURE: 133 MMHG | OXYGEN SATURATION: 100 % | DIASTOLIC BLOOD PRESSURE: 75 MMHG | TEMPERATURE: 97 F | RESPIRATION RATE: 16 BRPM | HEART RATE: 68 BPM | WEIGHT: 175.05 LBS

## 2021-03-25 DIAGNOSIS — S01.112A LACERATION WITHOUT FOREIGN BODY OF LEFT EYELID AND PERIOCULAR AREA, INITIAL ENCOUNTER: ICD-10-CM

## 2021-03-25 DIAGNOSIS — Z48.01 ENCOUNTER FOR CHANGE OR REMOVAL OF SURGICAL WOUND DRESSING: ICD-10-CM
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
## 2021-03-25 PROCEDURE — 99284 EMERGENCY DEPT VISIT MOD MDM: CPT

## 2021-03-25 NOTE — ED PROVIDER NOTE - NSFOLLOWUPINSTRUCTIONS_ED_ALL_ED_FT

## 2021-03-25 NOTE — ED PROVIDER NOTE - CARE PROVIDER_API CALL
Kishan Escoto  PLASTIC SURGERY  [address] [place] [address]  Phone: [phone]  Fax: [phone]  Follow Up Time: 7-10 Days

## 2021-03-25 NOTE — ED PROVIDER NOTE - CLINICAL SUMMARY MEDICAL DECISION MAKING FREE TEXT BOX
patient is well appearing with laceration repair by dr duarte we have discussed indications to return as well as return to play guidelines and concussion protocols I will discharge at this time.

## 2021-03-25 NOTE — ED PROVIDER NOTE - ATTENDING CONTRIBUTION TO CARE
I was present for and supervised the key and critical aspects of the procedures performed during the care of the patient. patient is well appearing with laceration repair by dr duarte we have discussed indications to return as well as return to play guidelines and concussion protocols I will discharge at this time.

## 2021-03-25 NOTE — ED PROVIDER NOTE - OBJECTIVE STATEMENT
Patient is a 36 yo m who presents for evaluation of laceration to right upper forehead last pm sent in by dr duarte for wound repair he deneis any loc and denies any vomiting he denies any other symptoms at this time

## 2021-03-25 NOTE — ED PROVIDER NOTE - PATIENT PORTAL LINK FT
You can access the FollowMyHealth Patient Portal offered by [facility] by registering at the following website: [email or web address]. By joining [employer]’s FollowMyHealth portal, you will also be able to view your health information using other applications (apps) compatible with our system.

## 2021-10-24 NOTE — H&P PST ADULT - HISTORY OF PRESENT ILLNESS
pt followed and answered simple yes/no questions 100% of the time during OT eval.  Pt unable to communicate verbally or through writing due to his injuries.  He nods or shakes head without difficulty. 34 yr old man to past for examination of the anorectum excision of anal skin tag. Pt states noted anal skin tag 6 mos ago was treating with derm no resolution ref to surgeon now for this procedure. Pt states no s/s infection pain drainage or changes in lesion. NO recent cough no uri uti cp palp sob denies s/s covid or contact with known positive people has covid test appt pre op 7/7. Pt stated exercise tolerance is > 3 flights of stairs no issues exercises on a regular basis and is athletic muscular. Blu screen revd negative pt with no loose teeth and no visible airway issues with from neck.  no pmh no psh no family issues with anes or bleeding disorders no sig fam history pt takes no meds at this time

## 2023-10-02 ENCOUNTER — APPOINTMENT (OUTPATIENT)
Dept: ORTHOPEDIC SURGERY | Facility: CLINIC | Age: 38
End: 2023-10-02
Payer: OTHER MISCELLANEOUS

## 2023-10-02 PROCEDURE — 99243 OFF/OP CNSLTJ NEW/EST LOW 30: CPT

## 2023-10-02 PROCEDURE — 99245 OFF/OP CONSLTJ NEW/EST HI 55: CPT

## 2024-03-13 ENCOUNTER — APPOINTMENT (OUTPATIENT)
Dept: ORTHOPEDIC SURGERY | Facility: CLINIC | Age: 39
End: 2024-03-13

## 2024-03-18 ENCOUNTER — APPOINTMENT (OUTPATIENT)
Dept: ORTHOPEDIC SURGERY | Facility: CLINIC | Age: 39
End: 2024-03-18
Payer: OTHER MISCELLANEOUS

## 2024-03-18 DIAGNOSIS — S46.912D STRAIN OF UNSPECIFIED MUSCLE, FASCIA AND TENDON AT SHOULDER AND UPPER ARM LEVEL, LEFT ARM, SUBSEQUENT ENCOUNTER: ICD-10-CM

## 2024-03-18 PROCEDURE — 99213 OFFICE O/P EST LOW 20 MIN: CPT

## 2024-03-18 PROCEDURE — 73000 X-RAY EXAM OF COLLAR BONE: CPT | Mod: LT

## 2024-03-18 NOTE — HISTORY OF PRESENT ILLNESS
[de-identified] : cc left shoulder.   38-year-old male presents left shoulder. RHD. He states that has pain with current movements. He also states that his left elbow has been giving him pain.   X-rays were performed in the office today of the clavicle, shows no fracture.  No bony or soft tissue calcifications.  on exam no pain erythema no swelling of the clavicle area no palpable masses  Transient clavicle pain currently asymptomatic no palpable masses  recommending PRN patient working full duty mildly disabled. He will follow up with hand and elbow specialist for his left elbow.

## 2024-04-16 ENCOUNTER — APPOINTMENT (OUTPATIENT)
Dept: ORTHOPEDIC SURGERY | Facility: CLINIC | Age: 39
End: 2024-04-16
Payer: COMMERCIAL

## 2024-04-16 DIAGNOSIS — M77.02 MEDIAL EPICONDYLITIS, LEFT ELBOW: ICD-10-CM

## 2024-04-16 PROCEDURE — 99202 OFFICE O/P NEW SF 15 MIN: CPT

## 2024-04-16 RX ORDER — MELOXICAM 15 MG/1
15 TABLET ORAL
Qty: 30 | Refills: 1 | Status: ACTIVE | COMMUNITY
Start: 2024-04-16 | End: 1900-01-01

## 2024-04-16 NOTE — PHYSICAL EXAM
[de-identified] : Patient is tender to palpation on the medial condyle.  Pain with resisted forearm pronation.  Normal sensation normal cap refill.  No erythema ecchymoses or abrasions.

## 2024-04-16 NOTE — ASSESSMENT
[FreeTextEntry1] : Patient has left-sided knee epicondylitis.  Counterforce bracing modification of activity discussed at length with the patient.  He will see me back on a as needed basis.  In 2 months from now if he still having pain and problems he will contact the office for further diagnostic testing we discussed the use of cortisone injections and PRP

## 2024-04-16 NOTE — HISTORY OF PRESENT ILLNESS
[de-identified] : 38-year-old male left-sided elbow pain discomfort.  Is bothering him mainly on the medial aspect of the left elbow.  He it bothers him with activities including lifting weights.

## 2024-11-07 ENCOUNTER — APPOINTMENT (OUTPATIENT)
Dept: ORTHOPEDIC SURGERY | Facility: CLINIC | Age: 39
End: 2024-11-07
Payer: COMMERCIAL

## 2024-11-07 DIAGNOSIS — M77.12 LATERAL EPICONDYLITIS, LEFT ELBOW: ICD-10-CM

## 2024-11-07 DIAGNOSIS — M77.02 MEDIAL EPICONDYLITIS, LEFT ELBOW: ICD-10-CM

## 2024-11-07 PROCEDURE — 99213 OFFICE O/P EST LOW 20 MIN: CPT

## 2024-11-07 RX ORDER — NAPROXEN 500 MG/1
500 TABLET ORAL
Qty: 60 | Refills: 0 | Status: ACTIVE | COMMUNITY
Start: 2024-11-07 | End: 1900-01-01

## 2024-12-09 ENCOUNTER — APPOINTMENT (OUTPATIENT)
Dept: ORTHOPEDIC SURGERY | Facility: CLINIC | Age: 39
End: 2024-12-09
Payer: COMMERCIAL

## 2024-12-09 DIAGNOSIS — M77.12 LATERAL EPICONDYLITIS, LEFT ELBOW: ICD-10-CM

## 2024-12-09 PROCEDURE — 99213 OFFICE O/P EST LOW 20 MIN: CPT

## 2024-12-09 RX ORDER — NAPROXEN 500 MG/1
500 TABLET ORAL
Qty: 60 | Refills: 2 | Status: ACTIVE | COMMUNITY
Start: 2024-12-09 | End: 1900-01-01

## 2024-12-16 ENCOUNTER — APPOINTMENT (OUTPATIENT)
Dept: ORTHOPEDIC SURGERY | Facility: CLINIC | Age: 39
End: 2024-12-16
Payer: COMMERCIAL

## 2024-12-16 DIAGNOSIS — M76.52 PATELLAR TENDINITIS, LEFT KNEE: ICD-10-CM

## 2024-12-16 PROCEDURE — 99214 OFFICE O/P EST MOD 30 MIN: CPT

## 2024-12-16 PROCEDURE — 73560 X-RAY EXAM OF KNEE 1 OR 2: CPT | Mod: LT

## 2024-12-16 RX ORDER — NABUMETONE 750 MG/1
750 TABLET, FILM COATED ORAL DAILY
Qty: 60 | Refills: 1 | Status: ACTIVE | COMMUNITY
Start: 2024-12-16 | End: 1900-01-01

## 2025-02-18 ENCOUNTER — APPOINTMENT (OUTPATIENT)
Facility: CLINIC | Age: 40
End: 2025-02-18
Payer: COMMERCIAL

## 2025-02-18 DIAGNOSIS — S83.272D COMPLEX TEAR OF LATERAL MENISCUS, CURRENT INJURY, LEFT KNEE, SUBSEQUENT ENCOUNTER: ICD-10-CM

## 2025-02-18 PROCEDURE — 99213 OFFICE O/P EST LOW 20 MIN: CPT | Mod: 25

## 2025-03-26 ENCOUNTER — APPOINTMENT (OUTPATIENT)
Age: 40
End: 2025-03-26

## 2025-03-26 DIAGNOSIS — M76.71 PERONEAL TENDINITIS, RIGHT LEG: ICD-10-CM

## 2025-03-26 PROCEDURE — 20550 NJX 1 TENDON SHEATH/LIGAMENT: CPT | Mod: RT

## 2025-03-26 RX ORDER — TRIAMCINOLONE ACETONIDE 10 MG/ML
10 INJECTION, SUSPENSION INTRA-ARTICULAR; INTRALESIONAL
Refills: 0 | Status: COMPLETED | OUTPATIENT
Start: 2025-03-26

## 2025-03-26 RX ADMIN — LIDOCAINE HYDROCHLORIDE,EPINEPHRINE BITARTRATE 0.5 %-1:100000: 20; .01 INJECTION, SOLUTION INFILTRATION; PERINEURAL at 00:00

## 2025-03-26 RX ADMIN — TRIAMCINOLONE ACETONIDE 1 MG/ML: 10 INJECTION, SUSPENSION INTRA-ARTICULAR; INTRALESIONAL at 00:00

## 2025-04-01 ENCOUNTER — APPOINTMENT (OUTPATIENT)
Facility: CLINIC | Age: 40
End: 2025-04-01
Payer: COMMERCIAL

## 2025-04-01 DIAGNOSIS — M94.262 CHONDROMALACIA, LEFT KNEE: ICD-10-CM

## 2025-04-01 PROCEDURE — 99213 OFFICE O/P EST LOW 20 MIN: CPT | Mod: 25

## 2025-04-01 PROCEDURE — 20610 DRAIN/INJ JOINT/BURSA W/O US: CPT | Mod: LT

## 2025-04-11 ENCOUNTER — APPOINTMENT (OUTPATIENT)
Facility: CLINIC | Age: 40
End: 2025-04-11

## 2025-04-11 DIAGNOSIS — M77.12 LATERAL EPICONDYLITIS, LEFT ELBOW: ICD-10-CM

## 2025-04-11 PROCEDURE — 99213 OFFICE O/P EST LOW 20 MIN: CPT | Mod: 25

## 2025-04-11 PROCEDURE — 20550 NJX 1 TENDON SHEATH/LIGAMENT: CPT | Mod: LT

## 2025-06-10 ENCOUNTER — APPOINTMENT (OUTPATIENT)
Facility: CLINIC | Age: 40
End: 2025-06-10
Payer: COMMERCIAL

## 2025-06-10 PROCEDURE — 99213 OFFICE O/P EST LOW 20 MIN: CPT

## 2025-07-22 ENCOUNTER — APPOINTMENT (OUTPATIENT)
Facility: CLINIC | Age: 40
End: 2025-07-22

## 2025-09-12 ENCOUNTER — APPOINTMENT (OUTPATIENT)
Facility: CLINIC | Age: 40
End: 2025-09-12
Payer: COMMERCIAL

## 2025-09-12 DIAGNOSIS — M77.02 MEDIAL EPICONDYLITIS, LEFT ELBOW: ICD-10-CM

## 2025-09-12 PROCEDURE — 20550 NJX 1 TENDON SHEATH/LIGAMENT: CPT

## 2025-09-12 PROCEDURE — 99213 OFFICE O/P EST LOW 20 MIN: CPT | Mod: 25
